# Patient Record
Sex: FEMALE | Race: WHITE
[De-identification: names, ages, dates, MRNs, and addresses within clinical notes are randomized per-mention and may not be internally consistent; named-entity substitution may affect disease eponyms.]

---

## 2017-10-24 ENCOUNTER — HOSPITAL ENCOUNTER (EMERGENCY)
Dept: HOSPITAL 25 - UCCORT | Age: 27
Discharge: HOME | End: 2017-10-24
Payer: COMMERCIAL

## 2017-10-24 VITALS — DIASTOLIC BLOOD PRESSURE: 62 MMHG | SYSTOLIC BLOOD PRESSURE: 115 MMHG

## 2017-10-24 DIAGNOSIS — J40: Primary | ICD-10-CM

## 2017-10-24 DIAGNOSIS — F17.210: ICD-10-CM

## 2017-10-24 DIAGNOSIS — F41.9: ICD-10-CM

## 2017-10-24 PROCEDURE — G0463 HOSPITAL OUTPT CLINIC VISIT: HCPCS

## 2017-10-24 PROCEDURE — 99211 OFF/OP EST MAY X REQ PHY/QHP: CPT

## 2017-10-24 PROCEDURE — 93005 ELECTROCARDIOGRAM TRACING: CPT

## 2017-10-24 NOTE — UC
Respiratory Complaint HPI





- HPI Summary


HPI Summary: 





CHEST CONGESTION , COUGH X 2 DAYS


+ CHEST PAIN AND TIGHTNESS, NO FEVER, NO CHILLS,


+ ANXIETY 


NO COLD SX 





- History of Current Complaint


Chief Complaint: UCChestPain


Stated Complaint: BREATHING,COUGH


Time Seen by Provider: 10/24/17 08:31


Hx Obtained From: Patient


Hx Last Menstrual Period: bleeding for months


Pregnant?: No


Onset/Duration: Gradual Onset, Lasting Days - 2, Still Present


Timing: Constant


Severity Initially: Moderate


Severity Currently: Moderate


Pain Intensity: 3


Pain Scale Used: 0-10 Numeric


Character: Cough: Nonproductive


Aggravating Factors: Exertion, Deep Breaths


Alleviating Factors: Nothing


Associated Signs And Symptoms: Positive: Dyspnea.  Negative: Fever, Chills, 

Pleuritic Chest Pain, Wheezing, Hemoptysis, Dizziness, Calf Pain, Calf Swelling

, Edema, URI, Nasal Congestion, Hoarseness, Sinus Discomfort





- Allergies/Home Medications


Allergies/Adverse Reactions: 


 Allergies











Allergy/AdvReac Type Severity Reaction Status Date / Time


 


environmental Allergy  Headache Uncoded 10/24/17 08:48











Home Medications: 


 Home Medications





Etonogestrel [Nexplanon] 68 mg IMPLANT DAILY 10/24/17 [History Confirmed 10/24/

17]


Norgestimate-Eth Estradiol(NF) [Ortho Tri-Cyclen (NF)] 1 tab PO DAILY 10/24/17 [

History Confirmed 10/24/17]











PMH/Surg Hx/FS Hx/Imm Hx


Psychological History: Anxiety





- Surgical History


Surgical History: Yes


Surgery Procedure, Year, and Place: THYROGLOSSAL CYST REMOVED 1994





- Family History


Known Family History: Positive: None


   Negative: Diabetes





- Social History


Alcohol Use: Occasionally


Substance Use Type: None


Smoking Status (MU): Light Every Day Tobacco Smoker


Amount Used/How Often: 1/4 ppd





Review of Systems


Constitutional: Negative


Skin: Negative


Eyes: Negative


ENT: Negative


Respiratory: Cough


Cardiovascular: Chest Pain


Gastrointestinal: Negative


Genitourinary: Negative


Is Patient Immunocompromised?: No


All Other Systems Reviewed And Are Negative: Yes





Physical Exam


Triage Information Reviewed: Yes


Appearance: Well-Appearing, No Pain Distress, Well-Nourished


Vital Signs: 


 Initial Vital Signs











Temp  98.3 F   10/24/17 08:26


 


Pulse  87   10/24/17 08:26


 


Resp  18   10/24/17 08:26


 


BP  115/62   10/24/17 08:26


 


Pulse Ox  100   10/24/17 08:26











Vital Signs Reviewed: Yes


Eyes: Positive: Conjunctiva Clear


ENT: Positive: Normal ENT inspection, Hearing grossly normal, Pharynx normal


Neck exam: Normal


Neck: Positive: Supple, Nontender, No Lymphadenopathy


Respiratory: Positive: Chest non-tender, Lungs clear, Normal breath sounds


Cardiovascular: Positive: RRR, No Murmur, Pulses Normal


Abdominal Exam: Normal


Abdomen Description: Positive: Nontender, Soft.  Negative: CVA Tenderness (R), 

CVA Tenderness (L), Distended, Guarding


Bowel Sounds: Positive: Present


Musculoskeletal: Positive: Strength Intact, ROM Intact, No Edema


Neurological: Positive: Alert, Muscle Tone Normal, Fatigued


Skin Exam: Normal





[facility] Diagnostic Evaluation





- Laboratory


O2 Sat by Pulse Oximetry: 100





Respiratory Course/Dx





- Differential Dx/Diagnosis


Provider Diagnoses: BRONCHITIS.  ANXIETY





Discharge





- Discharge Plan


Condition: Stable


Disposition: HOME


Patient Education Materials:  Viral Syndrome (ED), Anxiety (ED)


Referrals: 


No Primary Care Phys,NOPCP [Primary Care Provider] - 5 Days

## 2018-02-05 ENCOUNTER — HOSPITAL ENCOUNTER (EMERGENCY)
Dept: HOSPITAL 25 - UCCORT | Age: 28
Discharge: HOME | End: 2018-02-05
Payer: COMMERCIAL

## 2018-02-05 VITALS — DIASTOLIC BLOOD PRESSURE: 71 MMHG | SYSTOLIC BLOOD PRESSURE: 133 MMHG

## 2018-02-05 DIAGNOSIS — Z72.89: ICD-10-CM

## 2018-02-05 DIAGNOSIS — J45.909: Primary | ICD-10-CM

## 2018-02-05 DIAGNOSIS — F17.210: ICD-10-CM

## 2018-02-05 PROCEDURE — G0463 HOSPITAL OUTPT CLINIC VISIT: HCPCS

## 2018-02-05 PROCEDURE — 87502 INFLUENZA DNA AMP PROBE: CPT

## 2018-02-05 PROCEDURE — 71046 X-RAY EXAM CHEST 2 VIEWS: CPT

## 2018-02-05 PROCEDURE — 99212 OFFICE O/P EST SF 10 MIN: CPT

## 2018-02-05 NOTE — RAD
HISTORY: Cough, shortness of breath



COMPARISONS: None



VIEWS: 4: Frontal dual-energy and lateral views of the chest.



FINDINGS:

CARDIOMEDIASTINAL SILHOUETTE: The cardiomediastinal silhouette is normal.

MADHU: The madhu are normal.

PLEURA: The costophrenic angles are sharp. No pleural abnormalities are noted.

LUNG PARENCHYMA: The lungs are clear.

ABDOMEN: The upper abdomen is clear. There is no subphrenic gas.

BONES AND SOFT TISSUES: No bone or soft tissue abnormalities are noted.

OTHER: None.



IMPRESSION:

NO ACTIVE CARDIOPULMONARY DISEASE.

## 2018-02-05 NOTE — ED
Respiratory





- HPI Summary


HPI Summary: 





27 yr old female with the complaint of upper respiratory congestion and 

coughing.  She feels like she can't get a good breath, she reports feeling a 

pressure in both sides of the back of her lower chest with breathing, she is a 

smoker, she denies fever, but has had malaise and feeling tired.  She has a 

history of asthma. 





- History of Current Complaint


Chief Complaint: UCRespiratory


Stated Complaint: UPPER RESPIRATORY


Time Seen by Provider: 02/05/18 13:00


Pain Intensity: 8





- Allergy/Home Medications


Allergies/Adverse Reactions: 


 Allergies











Allergy/AdvReac Type Severity Reaction Status Date / Time


 


No Known Allergies Allergy   Verified 02/05/18 12:55














PMH/Surg Hx/FS Hx/Imm Hx


Respiratory History: Reports: Hx Asthma





- Surgical History


Surgery Procedure, Year, and Place: THYROGLOSSAL CYST REMOVED 1994


Infectious Disease History: No


Infectious Disease History: 


   Denies: Traveled Outside the [place] in Last 30 Days





- Family History


Known Family History: Positive: None


   Negative: Diabetes





- Social History


Occupation: Employed Full-time


Lives: With Family


Alcohol Use: Occasionally


Substance Use Type: Reports: None


Smoking Status (MU): Light Every Day Tobacco Smoker


Amount Used/How Often: ~1/4 PPD


Length of Time of Smoking/Using Tobacco: Since Age 16





Review of Systems


Positive: Fatigue.  Negative: Fever


Positive: Nasal Discharge


Positive: Shortness Of Breath, Cough


Positive: Myalgia


All Other Systems Reviewed And Are Negative: Yes





Physical Exam


Triage Information Reviewed: Yes


Vital Signs On Initial Exam: 


 Initial Vitals











Temp Pulse Resp BP Pulse Ox


 


 98 F   82   18   133/71   100 


 


 02/05/18 12:52  02/05/18 12:52  02/05/18 12:52  02/05/18 12:52  02/05/18 12:52











Vital Signs Reviewed: Yes


Appearance: Positive: Well-Appearing, No Pain Distress


Skin: Positive: Warm, Skin Color Reflects Adequate Perfusion


Head/Face: Positive: Normal Head/Face Inspection


Eyes: Positive: EOMI


ENT: Positive: Pharynx normal, Nasal congestion, TMs normal


Neck: Positive: Nontender


Respiratory/Lung Sounds: Positive: Decreased Breath Sounds


Cardiovascular: Positive: RRR.  Negative: Murmur


Abdomen Description: Positive: Nontender


Musculoskeletal: Positive: Strength/ROM Intact


Neurological: Positive: Sensory/Motor Intact, Alert, Oriented to Person Place, 

Time, CN Intact II-III


Psychiatric: Positive: Normal





- Elizabeth Coma Scale


Best Eye Response: 4 - Spontaneous


Best Motor Response: 6 - Obeys Commands


Best Verbal Response: 5 - Oriented


Coma Scale Total: 15





Diagnostics





- Vital Signs


 Vital Signs











  Temp Pulse Resp BP Pulse Ox


 


 02/05/18 12:52  98 F  82  18  133/71  100














- Laboratory


Lab Statement: Any lab studies that have been ordered have been reviewed, and 

results considered in the medical decision making process.





- Radiology


  ** chest xray


Xray Interpretation: No Acute Changes


Radiology Interpretation Completed By: Radiologist





Re-Evaluation





- Re-Evaluation


  ** First Eval


Re-Evaluation Time: 13:44


Change: Improved


Comment: The patient feels improvement after getting her neb.  D/C home.  She 

has an inhaler.  Will add prednisone for four days given her history of asthma.





Disposition





- Course


Course Of Treatment: 27 yr old with asthma.  Rx prednisone.  She has MDI 

already. influenza negative





- Diagnoses


Provider Diagnoses: 


 Asthmatic bronchitis








Discharge





- Discharge Plan


Condition: Good


Disposition: HOME


Prescriptions: 


predniSONE TAB* [Deltasone TAB*] 40 mg PO DAILY #8 tab


Patient Education Materials:  Bronchospasm (ED)


Referrals: 


No Primary Care Phys,NOPCP [Primary Care Provider] - 


[facility] PHYSICIAN REFERRAL [Outside]